# Patient Record
Sex: FEMALE | Race: WHITE | NOT HISPANIC OR LATINO | Employment: UNEMPLOYED | URBAN - METROPOLITAN AREA
[De-identification: names, ages, dates, MRNs, and addresses within clinical notes are randomized per-mention and may not be internally consistent; named-entity substitution may affect disease eponyms.]

---

## 2017-07-18 ENCOUNTER — TELEPHONE (OUTPATIENT)
Dept: GASTROENTEROLOGY | Facility: CLINIC | Age: 57
End: 2017-07-18

## 2017-07-18 NOTE — TELEPHONE ENCOUNTER
----- Message from Kalli Sheehan sent at 7/18/2017  3:26 PM CDT -----  Contact: Pt [phone]  States she is calling rg wanting to be seen per Dr Lynch to come for liver issues/ prev hep c /dbw/ itching/ exhaustion and can be reached at [phone]//thanks/dbw

## 2017-08-09 ENCOUNTER — OFFICE VISIT (OUTPATIENT)
Dept: GASTROENTEROLOGY | Facility: CLINIC | Age: 57
End: 2017-08-09
Payer: MEDICARE

## 2017-08-09 VITALS
HEIGHT: 65 IN | DIASTOLIC BLOOD PRESSURE: 76 MMHG | BODY MASS INDEX: 33.43 KG/M2 | WEIGHT: 200.63 LBS | HEART RATE: 58 BPM | SYSTOLIC BLOOD PRESSURE: 100 MMHG

## 2017-08-09 DIAGNOSIS — K74.00 LIVER FIBROSIS: Primary | ICD-10-CM

## 2017-08-09 DIAGNOSIS — Z86.19 HISTORY OF HEPATITIS C: ICD-10-CM

## 2017-08-09 PROCEDURE — 99204 OFFICE O/P NEW MOD 45 MIN: CPT | Mod: S$PBB,,, | Performed by: INTERNAL MEDICINE

## 2017-08-09 PROCEDURE — 3008F BODY MASS INDEX DOCD: CPT | Mod: ,,, | Performed by: INTERNAL MEDICINE

## 2017-08-09 PROCEDURE — 99999 PR PBB SHADOW E&M-EST. PATIENT-LVL III: CPT | Mod: PBBFAC,,, | Performed by: INTERNAL MEDICINE

## 2017-08-09 PROCEDURE — 99213 OFFICE O/P EST LOW 20 MIN: CPT | Mod: PBBFAC,PO | Performed by: INTERNAL MEDICINE

## 2017-08-09 RX ORDER — VALSARTAN AND HYDROCHLOROTHIAZIDE 320; 25 MG/1; MG/1
TABLET, FILM COATED ORAL DAILY
COMMUNITY
Start: 2017-07-31

## 2017-08-09 RX ORDER — CELECOXIB 200 MG/1
200 CAPSULE ORAL
COMMUNITY
Start: 2015-09-28

## 2017-08-09 RX ORDER — TOLTERODINE TARTRATE 1 MG/1
1 TABLET, EXTENDED RELEASE ORAL 2 TIMES DAILY
COMMUNITY
Start: 2017-05-23 | End: 2018-05-23

## 2017-08-09 RX ORDER — DULOXETIN HYDROCHLORIDE 60 MG/1
60 CAPSULE, DELAYED RELEASE ORAL DAILY
COMMUNITY

## 2017-08-09 RX ORDER — CLONAZEPAM 0.5 MG/1
0.5 TABLET ORAL
COMMUNITY

## 2017-08-09 RX ORDER — HYOSCYAMINE SULFATE 0.12 MG/1
0.12 TABLET, ORALLY DISINTEGRATING ORAL
COMMUNITY
Start: 2015-01-23

## 2017-08-09 RX ORDER — ZOLPIDEM TARTRATE 5 MG/1
5 TABLET ORAL NIGHTLY
COMMUNITY
Start: 2017-06-14

## 2017-08-09 RX ORDER — LAMOTRIGINE 200 MG/1
20 TABLET ORAL DAILY
COMMUNITY
Start: 2017-06-19

## 2017-08-09 RX ORDER — ACYCLOVIR 50 MG/G
OINTMENT TOPICAL
COMMUNITY
Start: 2016-11-04

## 2017-08-09 RX ORDER — PANTOPRAZOLE SODIUM 40 MG/1
40 TABLET, DELAYED RELEASE ORAL DAILY
COMMUNITY
Start: 2017-07-11

## 2017-08-09 RX ORDER — TIZANIDINE 2 MG/1
4 TABLET ORAL EVERY 6 HOURS PRN
COMMUNITY

## 2017-08-09 RX ORDER — ASCORBIC ACID 1000 MG
175 TABLET ORAL DAILY
COMMUNITY

## 2017-08-09 RX ORDER — CYCLOSPORINE 0.5 MG/ML
EMULSION OPHTHALMIC
COMMUNITY
Start: 2017-05-19

## 2017-08-09 RX ORDER — DICLOFENAC SODIUM 16.05 MG/ML
SOLUTION TOPICAL
COMMUNITY
Start: 2017-02-23

## 2017-08-09 NOTE — PROGRESS NOTES
Subjective:     Mike Browne is here for evaluation of Fatigue (referred by Dr. Lynch, accompanied by , Blaze) and History of hep C      HPI  Mike Browne is here for eval of h/o HCV. She has a remote h/o HCV that was treated. She is now cured but reports having a bx that had advanced fibrosis.    No evidence of liver decompensation: no ascites, confusion or GI bleeding.      Outside records reviewed    Labs from June 7, 2017 show white blood cell count of 6.1, hemoglobin 14.9, platelet count 207, sodium 144, potassium 3.8, bicarbonate 33, creatinine 0.77, albumin 3.9, total bilirubin 0.6, alkaline phosphatase 51, AST 17, ALT 15, Hepatitis C RNA undetected    Review of Systems   Constitutional: Positive for fatigue.   HENT: Negative.    Eyes: Negative.    Respiratory: Negative.    Cardiovascular: Negative.    Gastrointestinal: Negative.    Genitourinary: Negative.    Musculoskeletal: Positive for arthralgias and myalgias.   Skin: Negative.    Neurological: Negative.    Psychiatric/Behavioral: Negative.        Objective:     Physical Exam   Constitutional: She is oriented to person, place, and time. She appears well-developed and well-nourished. No distress.   HENT:   Head: Normocephalic and atraumatic.   Mouth/Throat: Oropharynx is clear and moist. No oropharyngeal exudate.   Eyes: Conjunctivae are normal. Pupils are equal, round, and reactive to light. Right eye exhibits no discharge. Left eye exhibits no discharge. No scleral icterus.   Pulmonary/Chest: Effort normal and breath sounds normal. No respiratory distress. She has no wheezes.   Abdominal: Soft. She exhibits no distension. There is no tenderness.   Musculoskeletal: She exhibits no edema.   Neurological: She is alert and oriented to person, place, and time.   Psychiatric: She has a normal mood and affect. Her behavior is normal.   Vitals reviewed.      Computed MELD-Na score unavailable. Necessary lab results were not  found in the last year.  Computed MELD score unavailable. Necessary lab results were not found in the last year.    No results found for: WBC, HGB, HCT, PLT  No results found for: BUN, CREATININE, GLU, CALCIUM, NA, K, CL, MG  No results found for: AST, ALT, ALKPHOS, BILITOT, ALBUMIN  No results found for: INR      Assessment/Plan:     1. Liver fibrosis    2. History of hepatitis C      Mike Browne is a 56 y.o. female withFatigue (referred by Dr. Lynch, accompanied by , Blaze) and History of hep C    Liver fibrosis- uncertain degree of fibrosis  -Extensive discussion with patient about how to reassess fibrosis I recommended repeat bx as the most accurate option but advised we could also start with fibroscan. She would like to proceed with fibroscan first.  -     US Abdomen Complete; Future; Expected date: 08/09/2017  -     US Elastography Liver; Future; Expected date: 08/09/2017    History of hepatitis C-cured  -     US Abdomen Complete; Future; Expected date: 08/09/2017  -     US Elastography Liver; Future; Expected date: 08/09/2017    More than 50% of face to face 40 minute visit spent reviewing records, discussiing dx and counseling patient on options of the plan.      Kenzie Dunlap MD

## 2017-08-21 PROBLEM — K74.00 LIVER FIBROSIS: Status: ACTIVE | Noted: 2017-08-21

## 2017-08-21 PROBLEM — Z86.19 HISTORY OF HEPATITIS C: Status: ACTIVE | Noted: 2017-08-21

## 2017-09-11 ENCOUNTER — TELEPHONE (OUTPATIENT)
Dept: RADIOLOGY | Facility: HOSPITAL | Age: 57
End: 2017-09-11

## 2017-09-12 ENCOUNTER — HOSPITAL ENCOUNTER (OUTPATIENT)
Dept: RADIOLOGY | Facility: HOSPITAL | Age: 57
Discharge: HOME | End: 2017-09-12
Attending: INTERNAL MEDICINE
Payer: MEDICARE

## 2017-09-12 DIAGNOSIS — K74.00 LIVER FIBROSIS: ICD-10-CM

## 2017-09-12 DIAGNOSIS — Z86.19 HISTORY OF HEPATITIS C: ICD-10-CM

## 2017-09-12 PROCEDURE — 76700 US EXAM ABDOM COMPLETE: CPT | Mod: TC,PO

## 2017-09-12 PROCEDURE — 76700 US EXAM ABDOM COMPLETE: CPT | Mod: 26,,, | Performed by: RADIOLOGY

## 2017-10-04 ENCOUNTER — TELEPHONE (OUTPATIENT)
Dept: GASTROENTEROLOGY | Facility: CLINIC | Age: 57
End: 2017-10-04

## 2017-10-04 NOTE — TELEPHONE ENCOUNTER
Spoke with Darlin with [facility]. She states not having any slides for this patient, JAMI.

## 2017-10-04 NOTE — TELEPHONE ENCOUNTER
----- Message from Barbara Meneses sent at 10/4/2017 11:27 AM CDT -----  Contact: Bernadine/[facility]  States she's calling regarding a request to release slides and a liver biopsy and the don't have anything on a liver biopsy for this patient. Please call Bernadine at [phone]. Thank you

## 2018-02-01 ENCOUNTER — TELEPHONE (OUTPATIENT)
Dept: GASTROENTEROLOGY | Facility: CLINIC | Age: 58
End: 2018-02-01

## 2018-02-01 ENCOUNTER — PATIENT MESSAGE (OUTPATIENT)
Dept: GASTROENTEROLOGY | Facility: CLINIC | Age: 58
End: 2018-02-01

## 2018-02-01 NOTE — TELEPHONE ENCOUNTER
----- Message from Ashwin Rincon sent at 2/1/2018  1:41 PM CST -----  Contact: [phone]   Pt would like to with the nurse about liver testing and phone number.  Please call back at [phone]. Thx_AH

## 2018-02-09 ENCOUNTER — PATIENT MESSAGE (OUTPATIENT)
Dept: GASTROENTEROLOGY | Facility: CLINIC | Age: 58
End: 2018-02-09

## 2018-02-16 ENCOUNTER — PROCEDURE VISIT (OUTPATIENT)
Dept: HEPATOLOGY | Facility: CLINIC | Age: 58
End: 2018-02-16
Attending: INTERNAL MEDICINE
Payer: MEDICARE

## 2018-02-16 DIAGNOSIS — Z86.19 HISTORY OF HEPATITIS C: ICD-10-CM

## 2018-02-16 DIAGNOSIS — K74.00 LIVER FIBROSIS: ICD-10-CM

## 2018-02-16 PROCEDURE — 91200 LIVER ELASTOGRAPHY: CPT | Mod: PBBFAC | Performed by: INTERNAL MEDICINE

## 2018-02-16 PROCEDURE — 91200 LIVER ELASTOGRAPHY: CPT | Mod: 26,S$PBB,, | Performed by: INTERNAL MEDICINE

## 2018-03-14 ENCOUNTER — PATIENT MESSAGE (OUTPATIENT)
Dept: GASTROENTEROLOGY | Facility: CLINIC | Age: 58
End: 2018-03-14

## 2018-03-14 ENCOUNTER — TELEPHONE (OUTPATIENT)
Dept: GASTROENTEROLOGY | Facility: CLINIC | Age: 58
End: 2018-03-14

## 2018-03-14 NOTE — TELEPHONE ENCOUNTER
----- Message from Mari Padilla sent at 3/14/2018  1:31 PM CDT -----  needs appt [facility]..[phone] (home)

## 2018-03-21 ENCOUNTER — TELEPHONE (OUTPATIENT)
Dept: GASTROENTEROLOGY | Facility: CLINIC | Age: 58
End: 2018-03-21

## 2018-03-21 DIAGNOSIS — Z86.19 HISTORY OF HEPATITIS C: Primary | ICD-10-CM

## 2018-03-21 DIAGNOSIS — R93.2 ABNORMAL ULTRASOUND OF LIVER: ICD-10-CM

## 2018-03-21 NOTE — TELEPHONE ENCOUNTER
----- Message from Paz Rendon sent at 3/21/2018  1:56 PM CDT -----  Contact: pt  Please call pt @ [phone], pt states she called over a week ago and no one has return her call.

## 2018-03-21 NOTE — TELEPHONE ENCOUNTER
Called patient to discuss fibroscan result. Fibrosure was 6.4 kPA which does not show significant fibrosis of the liver. Given her HCV is cured and no evidence of advanced liver disease patient can f/u one additional time around 9/2018 with labs and US.

## 2018-03-23 NOTE — PROCEDURES
Procedures   Fibroscan Procedure     Name: Mike Browne  Date of Procedure : 2018   :: Angel Mccloud MD  Diagnosis: HCV    Probe: M    Fibroscan readin.4 KPa    Fibrosis:F 0-1     CAP readin dB/m    Steatosis: :S1

## 2018-03-26 ENCOUNTER — PATIENT MESSAGE (OUTPATIENT)
Dept: GASTROENTEROLOGY | Facility: CLINIC | Age: 58
End: 2018-03-26

## 2018-03-27 NOTE — TELEPHONE ENCOUNTER
Spoke with patient about results and reassured that f/u in sept with labs and US on same day is ok.

## 2019-11-12 ENCOUNTER — PATIENT MESSAGE (OUTPATIENT)
Dept: GASTROENTEROLOGY | Facility: CLINIC | Age: 59
End: 2019-11-12

## 2024-11-18 ENCOUNTER — OFFICE VISIT (OUTPATIENT)
Dept: RHEUMATOLOGY | Facility: CLINIC | Age: 64
End: 2024-11-18
Payer: COMMERCIAL

## 2024-11-18 VITALS
HEART RATE: 80 BPM | WEIGHT: 177 LBS | HEIGHT: 65 IN | SYSTOLIC BLOOD PRESSURE: 120 MMHG | BODY MASS INDEX: 29.49 KG/M2 | DIASTOLIC BLOOD PRESSURE: 86 MMHG

## 2024-11-18 DIAGNOSIS — M50.90 CERVICAL DISC DISEASE: ICD-10-CM

## 2024-11-18 DIAGNOSIS — M19.011 OSTEOARTHRITIS OF RIGHT STERNOCLAVICULAR JOINT: ICD-10-CM

## 2024-11-18 DIAGNOSIS — M79.7 FIBROMYALGIA: Primary | ICD-10-CM

## 2024-11-18 PROCEDURE — 99999 PR PBB SHADOW E&M-NEW PATIENT-LVL V: CPT | Mod: PBBFAC,,, | Performed by: INTERNAL MEDICINE

## 2024-11-18 PROCEDURE — 1159F MED LIST DOCD IN RCRD: CPT | Mod: CPTII,S$GLB,, | Performed by: INTERNAL MEDICINE

## 2024-11-18 PROCEDURE — 99205 OFFICE O/P NEW HI 60 MIN: CPT | Mod: S$GLB,,, | Performed by: INTERNAL MEDICINE

## 2024-11-18 PROCEDURE — 1160F RVW MEDS BY RX/DR IN RCRD: CPT | Mod: CPTII,S$GLB,, | Performed by: INTERNAL MEDICINE

## 2024-11-18 PROCEDURE — 3008F BODY MASS INDEX DOCD: CPT | Mod: CPTII,S$GLB,, | Performed by: INTERNAL MEDICINE

## 2024-11-18 PROCEDURE — 3044F HG A1C LEVEL LT 7.0%: CPT | Mod: CPTII,S$GLB,, | Performed by: INTERNAL MEDICINE

## 2024-11-18 PROCEDURE — G2211 COMPLEX E/M VISIT ADD ON: HCPCS | Mod: S$GLB,,, | Performed by: INTERNAL MEDICINE

## 2024-11-18 PROCEDURE — 3074F SYST BP LT 130 MM HG: CPT | Mod: CPTII,S$GLB,, | Performed by: INTERNAL MEDICINE

## 2024-11-18 PROCEDURE — 3079F DIAST BP 80-89 MM HG: CPT | Mod: CPTII,S$GLB,, | Performed by: INTERNAL MEDICINE

## 2024-11-18 RX ORDER — HYDROCODONE BITARTRATE AND ACETAMINOPHEN 10; 325 MG/1; MG/1
TABLET ORAL
COMMUNITY
Start: 2024-04-18

## 2024-11-18 RX ORDER — IBUPROFEN 200 MG
TABLET ORAL
COMMUNITY

## 2024-11-18 RX ORDER — ESZOPICLONE 3 MG/1
TABLET, FILM COATED ORAL
COMMUNITY
Start: 2024-10-07

## 2024-11-18 RX ORDER — DULOXETIN HYDROCHLORIDE 30 MG/1
30 CAPSULE, DELAYED RELEASE ORAL DAILY
Qty: 30 CAPSULE | Refills: 11 | Status: SHIPPED | OUTPATIENT
Start: 2024-11-18 | End: 2025-11-18

## 2024-11-18 NOTE — PATIENT INSTRUCTIONS
Read Fiber Fueled, Why we sleep  and The Immune System Recover Plan ( books ) .   Dr. Andrew Weil's anti-inflammatory diet.   Take Arthritis strength extended release Tylenol 650 mg 1 tablet 3 times daily as needed for pain.  Start turmeric supplements 1000 mg 2 times daily for anti-inflammatory benefit.  Start osteo Bi-Flex triple strength 1 capsule 2 times daily for joint protection.

## 2024-11-18 NOTE — PROGRESS NOTES
RHEUMATOLOGY CLINIC INITIAL VISIT    Reason for consult:-  Fibromyalgia    Chief complaints, HPI, ROS, EXAM, Assessment & Plans:-  Mike Bradford a 64 y.o. pleasant female comes in with worsening pain.  Longstanding history of fibromyalgia recently by multiple specialists for worsening right upper arm shoulder pain involving the anterior and posterior chest wall.  After multiple workups she was found to have combination of osteoarthritis and cervical disc disease related neurogenic pain.  She is undergoing  neuromuscular massage and physical therapy with mild improvement.  She is also under care of pain specialist treated low-dose opioid therapy for survey syndrome worsening diffuse pain.  Rheumatological review of negative otherwise.  Physical shows well over 18 tender points.  Significant tenderness of right sternoclavicular joint with mild subluxation noted.  No synovitis of small joints.  No dactylitis, enthesitis or effusion.  Sclerodactyly.  No telangiectasia.  1. Fibromyalgia    2. Osteoarthritis of right sternoclavicular joint    3. Cervical disc disease      Problem List Items Addressed This Visit       Cervical disc disease    Fibromyalgia - Primary    Relevant Medications    DULoxetine (CYMBALTA) 30 MG capsule    Osteoarthritis of right sternoclavicular joint    Relevant Orders    Ambulatory referral/consult to Orthopedics      Latest Reference Range & Units 03/27/24 12:33   WBC 3.4 - 10.8 x10E3/uL 4.2 (E)   Hemoglobin 11.1 - 15.9 g/dL 15.0 (E)   Hematocrit 34.0 - 46.6 % 44.9 (E)   MCH 26.6 - 33.0 pg 29.6 (E)   MCHC 31.5 - 35.7 g/dL 33.4 (E)   RDW 11.7 - 15.4 % 13.5 (E)   Platelet Count 150 - 450 x10E3/uL 133 (L) (E)   Neutrophils Relative Not Estab. % 71 (E)   Lymphocytes % Not Estab. % 18 (E)   Eos % Not Estab. % 1 (E)   Immature Granulocytes Not Estab. % 0 (E)   Neutrophils, Abs 1.4 - 7.0 x10E3/uL 3.0 (E)   Lymphocytes Absolute 0.7 - 3.1 x10E3/uL 0.8 (E)   Eos # 0.0 - 0.4 x10E3/uL 0.0 (E)    Baso # 0.0 - 0.2 x10E3/uL 0.0 (E)   Immature Grans (Abs) 0.0 - 0.1 x10E3/uL 0.0 (E)   Cholesterol Total 100 - 199 mg/dL 165 (E)   HDL >39 mg/dL 65 (E)   LDL Calculated 0 - 99 mg/dL 88 (E)   Triglycerides 0 - 149 mg/dL 63 (E)   VLDL Cholesterol Uri 5 - 40 mg/dL 12 (E)   Hemoglobin A1C External 4.8 - 5.6 % 5.8 (H) (E)   TSH 0.450 - 4.500 uIU/mL 1.330 (E)   Basophils, CSF % Not Estab. % 1 (E)   Mean Corpuscular Volume 79 - 97 fL 89 (E)   Monocytes Absolute Count 0.1 - 0.9 x10E3/uL 0.4 (E)   Monocytes % Not Estab. % 9 (E)   Red Blood Cell Count 3.77 - 5.28 x10E6/uL 5.06 (E)   Vitamin D, 25-OH, Total 30.0 - 100.0 ng/mL 78.4 (E)   (L): Data is abnormally low  (H): Data is abnormally high  (E): External lab result      Severe worsening right upper pain with shoulder and right sternoclavicular tenderness and mild subluxation.  Consult orthopedics.  Mild worsening fibromyalgia.  On opioid pain medications which contraindicated use of low-dose naltrexone.  Mild drowsiness with muscle relaxants.  Gabapentin or Lyrica may not be appropriate.  Try higher dose of Cymbalta 90 once daily.  No evidence rheumatoid arthritis, lupus, Sjogren's, scleroderma or myositis.  I have explained all of the above in detail and the patient understands all of the current recommendation(s). I have answered all questions to the best of my ability and to their complete satisfaction.         # Follow up in about 6 months (around 2025).      Past Medical History:   Diagnosis Date    Fibromyalgia 2016    DR CHRISTINE ROMAN    Hepatitis C without hepatic coma 2016    4/3/2012 HEPATITIS C RNA. UNDETECTED. DR CHRISTINE ROMAN     Hypertension 2016    DR CHRISTINE ROMAN       History reviewed. No pertinent surgical history.     Social History     Tobacco Use    Smoking status: Former     Current packs/day: 0.00     Types: Cigarettes     Quit date: 1980     Years since quittin.8    Smokeless tobacco: Never   Substance Use Topics    Alcohol  use: No    Drug use: Yes     Comment: [id number] only, none since then       No family history on file.    Review of patient's allergies indicates:   Allergen Reactions    Hydroxyzine hcl     Meperidine     Ondansetron hcl (pf)        Medication List with Changes/Refills   New Medications    DULOXETINE (CYMBALTA) 30 MG CAPSULE    Take 1 capsule (30 mg total) by mouth once daily.   Current Medications    ACYCLOVIR 5% (ZOVIRAX) 5 % OINTMENT    as needed.    CLONAZEPAM (KLONOPIN) 0.5 MG TABLET    Take 0.5 mg by mouth as needed.    DICLOFENAC SODIUM 1.5 % DROP        DULOXETINE (CYMBALTA) 60 MG CAPSULE    Take 60 mg by mouth once daily.    ESZOPICLONE (LUNESTA) 3 MG TAB    TAKE 1 TABLET BY MOUTH NIGHTLY AS NEEDED (INSOMNIA)    FLAXSEED OIL MISC    by Misc.(Non-Drug; Combo Route) route.    HYDROCODONE-ACETAMINOPHEN (NORCO)  MG PER TABLET    TAKE 1/2 TABLET BY MOUTH 4 TIMES DAILY AS NEEDED FOR PAIN    HYOSCYAMINE (LEVSIN) 0.125 MG TBDL    Place 0.125 mg under the tongue as needed.    IBUPROFEN 50 MG/1.25 ML DRPS        LAMOTRIGINE (LAMICTAL) 200 MG TABLET    Take 20 mg by mouth once daily.    MILK THISTLE 175 MG TABLET    Take 175 mg by mouth once daily.    PANTOPRAZOLE (PROTONIX) 40 MG TABLET    Take 40 mg by mouth once daily.    RESTASIS 0.05 % OPHTHALMIC EMULSION        TIZANIDINE (ZANAFLEX) 2 MG TABLET    Take 4 mg by mouth every 6 (six) hours as needed.    TOLTERODINE (DETROL) 1 MG TAB    Take 1 mg by mouth 2 (two) times daily.    VALSARTAN-HYDROCHLOROTHIAZIDE (DIOVAN-HCT) 320-25 MG PER TABLET    once daily.   Discontinued Medications    CELECOXIB (CELEBREX) 200 MG CAPSULE    Take 200 mg by mouth as needed.    ZOLPIDEM (AMBIEN) 5 MG TAB    Take 5 mg by mouth every evening.         Thank you for allowing me to participate in the care ofMike Browne.    Disclaimer: This note was prepared using voice recognition system and is likely to have sound alike errors and is not proof read.  Please call me with  any questions.        Answers submitted by the patient for this visit:  Rheumatology Questionnaire (Submitted on 11/11/2024)  fever: No  eye redness: Yes  mouth sores: No  headaches: Yes  shortness of breath: No  chest pain: No  trouble swallowing: No  diarrhea: No  constipation: Yes  unexpected weight change: No  genital sore: No  dysuria: No  During the last 3 days, have you had a skin rash?: No  Bruises or bleeds easily: Yes  cough: No

## 2024-12-02 ENCOUNTER — PATIENT MESSAGE (OUTPATIENT)
Dept: ORTHOPEDICS | Facility: CLINIC | Age: 64
End: 2024-12-02
Payer: COMMERCIAL